# Patient Record
Sex: FEMALE | Race: OTHER | NOT HISPANIC OR LATINO | URBAN - METROPOLITAN AREA
[De-identification: names, ages, dates, MRNs, and addresses within clinical notes are randomized per-mention and may not be internally consistent; named-entity substitution may affect disease eponyms.]

---

## 2022-09-10 ENCOUNTER — EMERGENCY (EMERGENCY)
Facility: HOSPITAL | Age: 22
LOS: 1 days | Discharge: HOME | End: 2022-09-10
Admitting: EMERGENCY MEDICINE

## 2022-09-10 VITALS
RESPIRATION RATE: 16 BRPM | HEART RATE: 100 BPM | TEMPERATURE: 98 F | OXYGEN SATURATION: 98 % | SYSTOLIC BLOOD PRESSURE: 117 MMHG | DIASTOLIC BLOOD PRESSURE: 70 MMHG | WEIGHT: 145.06 LBS

## 2022-09-10 VITALS
TEMPERATURE: 98 F | HEART RATE: 84 BPM | RESPIRATION RATE: 18 BRPM | DIASTOLIC BLOOD PRESSURE: 69 MMHG | SYSTOLIC BLOOD PRESSURE: 110 MMHG | OXYGEN SATURATION: 96 %

## 2022-09-10 PROCEDURE — 99284 EMERGENCY DEPT VISIT MOD MDM: CPT

## 2022-09-10 RX ORDER — DIPHENHYDRAMINE HCL 50 MG
1 CAPSULE ORAL
Qty: 12 | Refills: 0
Start: 2022-09-10 | End: 2022-09-13

## 2022-09-10 RX ORDER — DIPHENHYDRAMINE HCL 50 MG
25 CAPSULE ORAL ONCE
Refills: 0 | Status: COMPLETED | OUTPATIENT
Start: 2022-09-10 | End: 2022-09-10

## 2022-09-10 RX ORDER — FAMOTIDINE 10 MG/ML
20 INJECTION INTRAVENOUS ONCE
Refills: 0 | Status: COMPLETED | OUTPATIENT
Start: 2022-09-10 | End: 2022-09-10

## 2022-09-10 RX ORDER — SODIUM CHLORIDE 9 MG/ML
1000 INJECTION INTRAMUSCULAR; INTRAVENOUS; SUBCUTANEOUS ONCE
Refills: 0 | Status: COMPLETED | OUTPATIENT
Start: 2022-09-10 | End: 2022-09-10

## 2022-09-10 RX ORDER — FAMOTIDINE 10 MG/ML
1 INJECTION INTRAVENOUS
Qty: 8 | Refills: 0
Start: 2022-09-10 | End: 2022-09-13

## 2022-09-10 RX ORDER — EPINEPHRINE 0.3 MG/.3ML
0.3 INJECTION INTRAMUSCULAR; SUBCUTANEOUS
Qty: 1 | Refills: 0
Start: 2022-09-10

## 2022-09-10 RX ORDER — ONDANSETRON 8 MG/1
4 TABLET, FILM COATED ORAL ONCE
Refills: 0 | Status: COMPLETED | OUTPATIENT
Start: 2022-09-10 | End: 2022-09-10

## 2022-09-10 RX ADMIN — FAMOTIDINE 20 MILLIGRAM(S): 10 INJECTION INTRAVENOUS at 14:23

## 2022-09-10 RX ADMIN — Medication 25 MILLIGRAM(S): at 14:39

## 2022-09-10 RX ADMIN — SODIUM CHLORIDE 1000 MILLILITER(S): 9 INJECTION INTRAMUSCULAR; INTRAVENOUS; SUBCUTANEOUS at 14:25

## 2022-09-10 RX ADMIN — ONDANSETRON 4 MILLIGRAM(S): 8 TABLET, FILM COATED ORAL at 14:23

## 2022-09-10 NOTE — ED PROVIDER NOTE - PROGRESS NOTE DETAILS
patient feeling better, symptoms resolved. patient observed for 4 hours, she is wishing to go home, mother at side. will rx prednisone, benadryl, pepcid x 4 days. rx epi pen prn. discussed return precautions. f/u pmd. will dc.

## 2022-09-10 NOTE — ED PROVIDER NOTE - CLINICAL SUMMARY MEDICAL DECISION MAKING FREE TEXT BOX
well appearing female allergic reaction s/p benadryl, decadron and epi pen. looks well. no signs of oropharyngeal edema. lungs cta, no vitals derangements. will order pepcid, benadryl, will monitor.

## 2022-09-10 NOTE — ED PROVIDER NOTE - PATIENT PORTAL LINK FT
You can access the FollowMyHealth Patient Portal offered by [facility] by registering at the following website: [email or web address]. By joining [employer]’s FollowMyHealth portal, you will also be able to view your health information using other applications (apps) compatible with our system.

## 2022-09-10 NOTE — ED PROVIDER NOTE - PHYSICAL EXAMINATION
VITAL SIGNS: I have reviewed nursing notes and confirm.  CONSTITUTIONAL: Well-developed; well-nourished;   SKIN: Skin is warm and dry, no acute rash. No hives.   HEAD: Normocephalic; atraumatic.  EYES: PERRL, EOM intact; conjunctiva and sclera clear.  ENT: No Tongue or lip swelling.   NECK: Supple; non tender.  CARD: S1, S2 normal; no murmurs, gallops, or rubs. Regular rate and rhythm.  RESP: No wheezes, rales or rhonchi. No conversational dyspnea. Speaking in full sentences.   ABD: Normal bowel sounds; soft; non-distended; non-tender.  MSK: Normal ROM. No clubbing, cyanosis or edema.  NEURO: Alert, oriented. Grossly unremarkable.  PSYCH: Cooperative, appropriate. VITAL SIGNS: I have reviewed nursing notes and confirm.  CONSTITUTIONAL: Well-developed; well-nourished;   SKIN: Skin is warm and dry, no acute rash. No hives.   HEAD: Normocephalic; atraumatic.  EYES: clear.  ENT: No Tongue or lip swelling. no oropharyngeal swelling.  NECK: Supple; non tender.  CARD: S1, S2 normal; no murmurs, gallops, or rubs. Regular rate and rhythm.  RESP: No wheezes, rales or rhonchi. No conversational dyspnea. Speaking in full sentences.   ABD:  soft; non-distended; non-tender.  MSK: Normal ROM x 4  NEURO: Alert, oriented. Grossly unremarkable.  PSYCH: Cooperative, appropriate.

## 2022-09-10 NOTE — ED ADULT TRIAGE NOTE - CHIEF COMPLAINT QUOTE
Allergic reaction after having a smoothie with peanut butter, c/o tongue swelling and nausea. Pt self administered 50 Benadryl , EMS gave EPI and 12mg of Dexamethasone  IM

## 2022-09-10 NOTE — ED PROVIDER NOTE - OBJECTIVE STATEMENT
21 y/o F with known allergies to peanuts [no other PMH] presents to the ED via EMS for evaluation s/p allergic reaction. Pt reports she was having a smoothie bowl that contained peanut butter. Afterwards, she began to develop tongue swelling that was associated with nausea. Pt took 50mg Benadryl. EMS was called who administered Epi and IM Dexamethasone. Pt denies CP, SOB, vomiting, or rashes. 23 y/o F with known allergies to peanutspresents to the ED via EMS for evaluation s/p allergic reaction. Pt reports she was having a smoothie bowl that contained peanut butter. Afterwards, she began to develop tongue swelling that was associated with nausea. Pt took 50mg Benadryl po. EMS was called who administered Epi IM and Dexamethasone 12mg IM. Pt denies CP, SOB, vomiting, or rash. never used epi pen in the past.

## 2022-09-10 NOTE — ED PROVIDER NOTE - NSFOLLOWUPINSTRUCTIONS_ED_ALL_ED_FT
Follow up with your primary care doctor or clinics listed below if you do not have a doctor  [address] [address]  To make an appointment, call [phone]  [facility]  Address: [address] [place] [id number]  Appointment Center: [phone] ([phone])     An allergic reaction is an abnormal reaction to a substance (allergen) by the body's defense system. Common allergens include medicines, food, insect bites or stings, and blood products. The body releases certain proteins into the blood that can cause a variety of symptoms such as an itchy rash, wheezing, swelling of the face/lips/tongue/throat, abdominal pain, nausea or vomiting. An allergic reaction is usually treated with medication. If your health care provider prescribed you an epinephrine injection device, make sure to keep it with you at all times.    SEEK IMMEDIATE MEDICAL CARE IF YOU HAVE ANY OF THE FOLLOWING SYMPTOMS: allergic reaction severe enough that required you to use epinephrine, tightness in your chest, swelling around your lips/tongue/throat, abdominal pain, vomiting or diarrhea, or lightheadedness/dizziness. These symptoms may represent a serious problem that is an emergency. Do not wait to see if the symptoms will go away. Use your auto-injector pen or anaphylaxis kit as you have been instructed. Call 911 and do not drive yourself to the hospital.

## 2022-09-10 NOTE — ED ADULT NURSE NOTE - OBJECTIVE STATEMENT
Pt aox3 with steady gait. Allergic reaction after having a smoothie with peanut butter, c/o tongue swelling and nausea. Pt self administered 50 Benadryl , EMS gave EPI and 12mg of Dexamethasone  IM

## 2022-09-12 DIAGNOSIS — R22.0 LOCALIZED SWELLING, MASS AND LUMP, HEAD: ICD-10-CM
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
## 2022-09-12 DIAGNOSIS — R11.0 NAUSEA: ICD-10-CM

## 2022-09-12 DIAGNOSIS — Z91.010 ALLERGY TO PEANUTS: ICD-10-CM

## 2022-09-12 DIAGNOSIS — T78.1XXA OTHER ADVERSE FOOD REACTIONS, NOT ELSEWHERE CLASSIFIED, INITIAL ENCOUNTER: ICD-10-CM
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
## 2023-01-12 NOTE — ED ADULT NURSE NOTE - NS ED NURSE LEVEL OF CONSCIOUSNESS AFFECT
Pt demonstrates ability to turn self in bed without assistance of staff. Patient and family understands importance in prevention of skin breakdown, ulcers, and potential infection. Hourly rounding in effect. RN skin check complete.   Devices in place include: PIV x2, EKG leads x3, BP cuff, Continuous pulse oximeter.  Skin assessed under devices: Yes.  Confirmed HAPI identified on the following date: N/A   Location of HAPI: N/A.  Wound Care RN following: No.  The following interventions are in place: Devices rotated with assessments and as needed, pillows in use for positioning, diaper changed frequently and nystatin/zinc in use for rash.     Calm/Appropriate